# Patient Record
Sex: FEMALE | Race: WHITE | NOT HISPANIC OR LATINO | URBAN - METROPOLITAN AREA
[De-identification: names, ages, dates, MRNs, and addresses within clinical notes are randomized per-mention and may not be internally consistent; named-entity substitution may affect disease eponyms.]

---

## 2018-01-22 ENCOUNTER — OUTPATIENT (OUTPATIENT)
Dept: OUTPATIENT SERVICES | Facility: HOSPITAL | Age: 38
LOS: 1 days | Discharge: HOME | End: 2018-01-22
Payer: COMMERCIAL
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
## 2018-01-22 DIAGNOSIS — S01.311A LACERATION WITHOUT FOREIGN BODY OF RIGHT EAR, INITIAL ENCOUNTER: ICD-10-CM

## 2018-01-22 DIAGNOSIS — Z82.0 FAMILY HISTORY OF EPILEPSY AND OTHER DISEASES OF THE NERVOUS SYSTEM: ICD-10-CM
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
## 2018-01-22 DIAGNOSIS — Z87.891 PERSONAL HISTORY OF NICOTINE DEPENDENCE: ICD-10-CM

## 2018-01-22 DIAGNOSIS — F41.9 ANXIETY DISORDER, UNSPECIFIED: ICD-10-CM

## 2018-01-22 DIAGNOSIS — Z83.3 FAMILY HISTORY OF DIABETES MELLITUS: ICD-10-CM

## 2018-01-22 DIAGNOSIS — Z82.49 FAMILY HISTORY OF ISCHEMIC HEART DISEASE AND OTHER DISEASES OF THE CIRCULATORY SYSTEM: ICD-10-CM

## 2018-01-22 DIAGNOSIS — T81.33XA DISRUPTION OF TRAUMATIC INJURY WOUND REPAIR, INITIAL ENCOUNTER: ICD-10-CM
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
## 2018-01-22 DIAGNOSIS — Z79.899 OTHER LONG TERM (CURRENT) DRUG THERAPY: ICD-10-CM

## 2018-01-22 DIAGNOSIS — Z80.52 FAMILY HISTORY OF MALIGNANT NEOPLASM OF BLADDER: ICD-10-CM

## 2018-01-22 PROCEDURE — G0463: CPT | Mod: 25

## 2018-01-22 PROCEDURE — 12011 RPR F/E/E/N/L/M 2.5 CM/<: CPT

## 2019-05-01 ENCOUNTER — APPOINTMENT (OUTPATIENT)
Dept: PULMONOLOGY | Facility: CLINIC | Age: 39
End: 2019-05-01
Payer: COMMERCIAL

## 2019-05-01 VITALS
BODY MASS INDEX: 21.16 KG/M2 | WEIGHT: 127 LBS | SYSTOLIC BLOOD PRESSURE: 90 MMHG | OXYGEN SATURATION: 98 % | DIASTOLIC BLOOD PRESSURE: 60 MMHG | HEART RATE: 74 BPM | HEIGHT: 65 IN

## 2019-05-01 DIAGNOSIS — F51.04 PSYCHOPHYSIOLOGIC INSOMNIA: ICD-10-CM

## 2019-05-01 DIAGNOSIS — G47.33 OBSTRUCTIVE SLEEP APNEA (ADULT) (PEDIATRIC): ICD-10-CM

## 2019-05-01 DIAGNOSIS — R05 COUGH: ICD-10-CM

## 2019-05-01 DIAGNOSIS — Z83.49 FAMILY HISTORY OF OTHER ENDOCRINE, NUTRITIONAL AND METABOLIC DISEASES: ICD-10-CM

## 2019-05-01 DIAGNOSIS — R09.82 POSTNASAL DRIP: ICD-10-CM

## 2019-05-01 DIAGNOSIS — Z83.3 FAMILY HISTORY OF DIABETES MELLITUS: ICD-10-CM

## 2019-05-01 DIAGNOSIS — Z87.891 PERSONAL HISTORY OF NICOTINE DEPENDENCE: ICD-10-CM

## 2019-05-01 DIAGNOSIS — F41.1 GENERALIZED ANXIETY DISORDER: ICD-10-CM

## 2019-05-01 DIAGNOSIS — Z00.00 ENCOUNTER FOR GENERAL ADULT MEDICAL EXAMINATION W/OUT ABNORMAL FINDINGS: ICD-10-CM

## 2019-05-01 PROCEDURE — 94664 DEMO&/EVAL PT USE INHALER: CPT | Mod: 59

## 2019-05-01 PROCEDURE — 94729 DIFFUSING CAPACITY: CPT

## 2019-05-01 PROCEDURE — 85018 HEMOGLOBIN: CPT | Mod: QW

## 2019-05-01 PROCEDURE — 94727 GAS DIL/WSHOT DETER LNG VOL: CPT

## 2019-05-01 PROCEDURE — 99204 OFFICE O/P NEW MOD 45 MIN: CPT | Mod: 25

## 2019-05-01 PROCEDURE — 94060 EVALUATION OF WHEEZING: CPT

## 2019-05-07 ENCOUNTER — FORM ENCOUNTER (OUTPATIENT)
Age: 39
End: 2019-05-07

## 2019-05-08 ENCOUNTER — APPOINTMENT (OUTPATIENT)
Dept: ULTRASOUND IMAGING | Facility: CLINIC | Age: 39
End: 2019-05-08
Payer: COMMERCIAL

## 2019-05-08 ENCOUNTER — OUTPATIENT (OUTPATIENT)
Dept: OUTPATIENT SERVICES | Facility: HOSPITAL | Age: 39
LOS: 1 days | End: 2019-05-08

## 2019-05-08 DIAGNOSIS — Z00.8 ENCOUNTER FOR OTHER GENERAL EXAMINATION: ICD-10-CM

## 2019-05-08 PROCEDURE — 71046 X-RAY EXAM CHEST 2 VIEWS: CPT | Mod: 26

## 2019-05-08 PROCEDURE — 76705 ECHO EXAM OF ABDOMEN: CPT | Mod: 26

## 2019-07-03 ENCOUNTER — RX RENEWAL (OUTPATIENT)
Age: 39
End: 2019-07-03

## 2019-07-03 RX ORDER — ALPRAZOLAM 0.5 MG/1
0.5 TABLET, ORALLY DISINTEGRATING ORAL TWICE DAILY
Qty: 30 | Refills: 0 | Status: COMPLETED | COMMUNITY
Start: 2019-07-03 | End: 2019-07-18

## 2020-04-14 ENCOUNTER — APPOINTMENT (OUTPATIENT)
Dept: PODIATRY | Facility: HOSPITAL | Age: 40
End: 2020-04-14
Payer: COMMERCIAL

## 2020-04-14 ENCOUNTER — OUTPATIENT (OUTPATIENT)
Dept: OUTPATIENT SERVICES | Facility: HOSPITAL | Age: 40
LOS: 1 days | Discharge: HOME | End: 2020-04-14
Payer: COMMERCIAL

## 2020-04-14 VITALS
TEMPERATURE: 97.4 F | WEIGHT: 127 LBS | BODY MASS INDEX: 21.16 KG/M2 | HEIGHT: 65 IN | DIASTOLIC BLOOD PRESSURE: 83 MMHG | OXYGEN SATURATION: 98 % | RESPIRATION RATE: 20 BRPM | SYSTOLIC BLOOD PRESSURE: 128 MMHG | HEART RATE: 88 BPM

## 2020-04-14 DIAGNOSIS — Z86.79 PERSONAL HISTORY OF OTHER DISEASES OF THE CIRCULATORY SYSTEM: ICD-10-CM

## 2020-04-14 DIAGNOSIS — L60.0 INGROWING NAIL: ICD-10-CM

## 2020-04-14 DIAGNOSIS — L97.501 NON-PRESSURE CHRONIC ULCER OF OTHER PART OF UNSPECIFIED FOOT LIMITED TO BREAKDOWN OF SKIN: ICD-10-CM

## 2020-04-14 PROCEDURE — 11730 AVULSION NAIL PLATE SIMPLE 1: CPT | Mod: T6

## 2020-04-14 PROCEDURE — 88312 SPECIAL STAINS GROUP 1: CPT | Mod: 26

## 2020-04-14 PROCEDURE — 88304 TISSUE EXAM BY PATHOLOGIST: CPT

## 2020-04-14 PROCEDURE — 88312 SPECIAL STAINS GROUP 1: CPT

## 2020-04-14 PROCEDURE — 99214 OFFICE O/P EST MOD 30 MIN: CPT | Mod: 25

## 2020-04-14 PROCEDURE — 88304 TISSUE EXAM BY PATHOLOGIST: CPT | Mod: 26

## 2020-04-14 PROCEDURE — G0463: CPT | Mod: 25

## 2020-04-14 RX ORDER — ALPRAZOLAM 0.25 MG/1
0.25 TABLET ORAL DAILY
Refills: 0 | Status: ACTIVE | COMMUNITY

## 2020-04-14 RX ORDER — BUPROPION HYDROCHLORIDE 150 MG/1
150 TABLET, FILM COATED ORAL
Refills: 0 | Status: DISCONTINUED | COMMUNITY
End: 2020-04-14

## 2020-04-14 NOTE — PHYSICAL EXAM
[4 x 4] : 4 x 4  [Normal Breath Sounds] : Normal breath sounds [1+] : left 1+ [2+] : left 2+ [Ankle Swelling (On Exam)] : not present [Varicose Veins Of Lower Extremities] : not present [] : not present [No Rash or Lesion] : No rash or lesion [Alert] : alert [Oriented to Person] : oriented to person [Oriented to Place] : oriented to place [Oriented to Time] : oriented to time [Calm] : calm [de-identified] : comfortable  [de-identified] : neg [de-identified] : hammer toes  [de-identified] : deformed painful 2nd toe nail right foot  [de-identified] : wnl [FreeTextEntry1] : Right foot 2nd digit nail bed - Ecchymotic nail (No open wound) [de-identified] :  45mg of Bupivacaine 0.5% local injection. [de-identified] : Total nail avulsion  [de-identified] : Iodosorb [de-identified] : Cleansed with NS\par Kerlix \par \par Scant amount of bleeding during procedure. Patient reports no pain post procedure and tolerated well.  [TWNoteComboBox4] : None [de-identified] : Normal [de-identified] : None [de-identified] : None [de-identified] : No [de-identified] : Weekly [de-identified] : Primary Dressing

## 2020-04-14 NOTE — REVIEW OF SYSTEMS
[Fever] : no fever [Chills] : no chills [Loss Of Hearing] : no hearing loss [Shortness Of Breath] : no shortness of breath [Abdominal Pain] : no abdominal pain [Joint Stiffness] : no joint stiffness [Confused] : no confusion [Limb Weakness] : no limb weakness [Anxiety] : no anxiety [Muscle Weakness] : no muscle weakness [Negative] : Cardiovascular [de-identified] : painful 2nd toe nail right foot

## 2020-04-14 NOTE — PROCEDURE
[Right Foot] : right foot was [Total] : total nail. The nail was grasped with a hemostat and removed in total.  The exposed nail bed was cleaned and flushed with sterile saline.  The nail bed was free of any purulence, debris and/or necrotic tissue. [Right foot, 2nd digit] : right foot, second digit [Iodosorb] : iodosorb [] : Yes [FreeTextEntry9] : 1000 [de-identified] : Painful deformed 2nd toe nail  secondary to trauma ( ingrown) [de-identified] : Simone [FreeTextEntry6] : Painful 2nd toe nail right  [FreeTextEntry7] : same  [de-identified] : 10 cc marcaine [de-identified] : 5cc [de-identified] : 2nd toe nail

## 2020-04-14 NOTE — VITALS
[Pain related to present condition?] : The patient's  pain is related to present condition. [Dull] : dull [Occasional] : occasional [] : Yes [de-identified] : 2/10 Patient stated 2/10 is a tolerable level.  [FreeTextEntry3] : Right foot 2nd digit  [FreeTextEntry1] : Offloading  [FreeTextEntry2] : Direct contact.  [FreeTextEntry4] : Bupivacaine local injection

## 2020-04-14 NOTE — ASSESSMENT
[Verbal] : Verbal [Written] : Written [Demo] : Demo [Patient] : Patient [Good - alert, interested, motivated] : Good - alert, interested, motivated [Verbalizes knowledge/Understanding] : Verbalizes knowledge/understanding [Dressing changes] : dressing changes [Foot Care] : foot care [Signs and symptoms of infection] : sign and symptoms of infection [Surgery] : surgery [How and When to Call] : how and when to call [Labs and Tests] : labs and tests [Pain Management] : pain management [Off-loading] : off-loading [Patient responsibility to plan of care] : patient responsibility to plan of care [] : Yes [Stable] : stable [Home] : Home [Ambulatory] : Ambulatory [Not Applicable - Long Term Care/Home Health Agency] : Long Term Care/Home Health Agency: Not Applicable [FreeTextEntry2] : Offloading\par Infection prevention \par Localized wound care  [FreeTextEntry4] : Same day auth submitted for total nail avulsion \par Sample sent to pathology\par Patient will call for a follow up

## 2020-04-14 NOTE — REVIEW OF SYSTEMS
[Fever] : no fever [Chills] : no chills [Loss Of Hearing] : no hearing loss [Shortness Of Breath] : no shortness of breath [Abdominal Pain] : no abdominal pain [Joint Stiffness] : no joint stiffness [Confused] : no confusion [Limb Weakness] : no limb weakness [Anxiety] : no anxiety [Muscle Weakness] : no muscle weakness [Negative] : Cardiovascular [de-identified] : painful 2nd toe nail right foot

## 2020-04-14 NOTE — REASON FOR VISIT
[Consultation] : a consultation visit [FreeTextEntry1] : 2 months ago patient stated she stubbed and/or dropped something on her right foot 2nd digit. 1 month ago patient noticed hematoma and nail cracked on the same toe with Serous/sanguinous drainage. Patient has been cleaning it with soap and water at home.

## 2020-04-14 NOTE — PHYSICAL EXAM
[4 x 4] : 4 x 4  [Normal Breath Sounds] : Normal breath sounds [1+] : left 1+ [2+] : left 2+ [Ankle Swelling (On Exam)] : not present [Varicose Veins Of Lower Extremities] : not present [] : not present [No Rash or Lesion] : No rash or lesion [Alert] : alert [Oriented to Person] : oriented to person [Oriented to Place] : oriented to place [Oriented to Time] : oriented to time [Calm] : calm [de-identified] : comfortable  [de-identified] : neg [de-identified] : hammer toes  [de-identified] : deformed painful 2nd toe nail right foot  [de-identified] : wnl [FreeTextEntry1] : Right foot 2nd digit nail bed - Ecchymotic nail (No open wound) [de-identified] :  45mg of Bupivacaine 0.5% local injection. [de-identified] : Total nail avulsion  [de-identified] : Iodosorb [de-identified] : Cleansed with NS\par Kerlix \par \par Scant amount of bleeding during procedure. Patient reports no pain post procedure and tolerated well.  [TWNoteComboBox4] : None [de-identified] : Normal [de-identified] : None [de-identified] : None [de-identified] : No [de-identified] : Weekly [de-identified] : Primary Dressing

## 2020-04-14 NOTE — PROCEDURE
[Right Foot] : right foot was [Total] : total nail. The nail was grasped with a hemostat and removed in total.  The exposed nail bed was cleaned and flushed with sterile saline.  The nail bed was free of any purulence, debris and/or necrotic tissue. [Right foot, 2nd digit] : right foot, second digit [Iodosorb] : iodosorb [] : Yes [FreeTextEntry9] : 1000 [de-identified] : Painful deformed 2nd toe nail  secondary to trauma ( ingrown) [de-identified] : Simone [FreeTextEntry6] : Painful 2nd toe nail right  [FreeTextEntry7] : same  [de-identified] : 10 cc marcaine [de-identified] : 5cc [de-identified] : 2nd toe nail

## 2020-04-14 NOTE — VITALS
[Pain related to present condition?] : The patient's  pain is related to present condition. [Dull] : dull [Occasional] : occasional [] : Yes [de-identified] : 2/10 Patient stated 2/10 is a tolerable level.  [FreeTextEntry3] : Right foot 2nd digit  [FreeTextEntry1] : Offloading  [FreeTextEntry2] : Direct contact.  [FreeTextEntry4] : Bupivacaine local injection

## 2020-04-15 DIAGNOSIS — Z79.899 OTHER LONG TERM (CURRENT) DRUG THERAPY: ICD-10-CM

## 2020-04-15 DIAGNOSIS — L60.0 INGROWING NAIL: ICD-10-CM

## 2020-04-15 DIAGNOSIS — F41.1 GENERALIZED ANXIETY DISORDER: ICD-10-CM

## 2020-04-15 DIAGNOSIS — Z83.3 FAMILY HISTORY OF DIABETES MELLITUS: ICD-10-CM

## 2020-04-15 DIAGNOSIS — G47.33 OBSTRUCTIVE SLEEP APNEA (ADULT) (PEDIATRIC): ICD-10-CM

## 2020-04-15 DIAGNOSIS — Z82.49 FAMILY HISTORY OF ISCHEMIC HEART DISEASE AND OTHER DISEASES OF THE CIRCULATORY SYSTEM: ICD-10-CM

## 2020-04-15 DIAGNOSIS — F51.04 PSYCHOPHYSIOLOGIC INSOMNIA: ICD-10-CM

## 2020-04-15 DIAGNOSIS — Z87.891 PERSONAL HISTORY OF NICOTINE DEPENDENCE: ICD-10-CM

## 2020-04-15 LAB — SURGICAL PATHOLOGY STUDY: SIGNIFICANT CHANGE UP
